# Patient Record
Sex: FEMALE | Race: WHITE
[De-identification: names, ages, dates, MRNs, and addresses within clinical notes are randomized per-mention and may not be internally consistent; named-entity substitution may affect disease eponyms.]

---

## 2019-07-23 ENCOUNTER — HOSPITAL ENCOUNTER (OUTPATIENT)
Dept: HOSPITAL 74 - JASU-SURG | Age: 64
Discharge: HOME | End: 2019-07-23
Attending: OBSTETRICS & GYNECOLOGY
Payer: COMMERCIAL

## 2019-07-23 VITALS — TEMPERATURE: 98 F | HEART RATE: 66 BPM | SYSTOLIC BLOOD PRESSURE: 131 MMHG | DIASTOLIC BLOOD PRESSURE: 71 MMHG

## 2019-07-23 VITALS — BODY MASS INDEX: 35.3 KG/M2

## 2019-07-23 DIAGNOSIS — N84.0: ICD-10-CM

## 2019-07-23 DIAGNOSIS — D25.9: Primary | ICD-10-CM

## 2019-07-23 PROCEDURE — 0UDB7ZX EXTRACTION OF ENDOMETRIUM, VIA NATURAL OR ARTIFICIAL OPENING, DIAGNOSTIC: ICD-10-PCS | Performed by: OBSTETRICS & GYNECOLOGY

## 2019-07-23 PROCEDURE — 0UB98ZZ EXCISION OF UTERUS, VIA NATURAL OR ARTIFICIAL OPENING ENDOSCOPIC: ICD-10-PCS | Performed by: OBSTETRICS & GYNECOLOGY

## 2019-07-23 PROCEDURE — 0UB97ZX EXCISION OF UTERUS, VIA NATURAL OR ARTIFICIAL OPENING, DIAGNOSTIC: ICD-10-PCS | Performed by: OBSTETRICS & GYNECOLOGY

## 2019-07-23 PROCEDURE — 0UJD8ZZ INSPECTION OF UTERUS AND CERVIX, VIA NATURAL OR ARTIFICIAL OPENING ENDOSCOPIC: ICD-10-PCS | Performed by: OBSTETRICS & GYNECOLOGY

## 2019-07-23 NOTE — HP
History & Physical Update





- History


History: No Change (Consent signed and witnessed All questions answered)





- Physical


Physical: No Change





- Assessment


Assessment: No Change





- Plan


Plan: No Change (Procedure explained to the patient Consent signed and witnessed

)

## 2019-07-23 NOTE — OP
DATE OF OPERATION:  07/23/2019

 

PREOPERATIVE DIAGNOSIS:  A 63-year-old with thick endometrium.

 

OPERATION:  Hysteroscopy, myomectomy, polypectomy, dilation and curettage.

 

FINDINGS:  Anterior fundal 1-cm fibroid, removed and sent to Pathology; and lower

uterine segment polyp removed and sent to Pathology.

 

POSTOPERATIVE DIAGNOSIS:  A 63-year-old with thick endometrium.

 

SURGEON:  Holly Calderon MD

 

ANESTHESIOLOGIST:  Milly Shafer MD

 

ANESTHESIA:  General.

 

DESCRIPTION OF THE OPERATIVE PROCEDURE:  After assuring informed consent, patient was

brought to the operating room, where she was placed in dorsal lithotomy position. 

Perineum and vagina were prepped and draped in sterile fashion.  Hendrickson retractors were

placed into the vagina and cervix was articulated with single-tooth tenaculum. 

Cervix was dilated without any difficulty and a Symphion hysteroscope was introduced

into the cervix and the uterus.  Above findings were noted, and the hand-held

resecting device was introduced through the resecting port.  A 9-Belgian resecting

device was used to remove the fibroid and the polyp.  Excellent hemostasis was noted.

 All instruments were removed from the uterus, cervix, and vagina.  Estimated blood

loss 5 mL.  Urine output 100 mL.  The patient received 600 mL of IV fluid.  Patient

tolerated the procedure well.  Instrument and sponge count was correct x2.  Patient

was brought to the recovery room in stable condition.

 

 

 

RON [id number]

DD: 07/23/2019 14:56

DT: 07/23/2019 21:55

Job #:  [id number]

## 2019-07-23 NOTE — OP
Operative Note





- Note:


Operative Date: 07/23/19


Pre-Operative Diagnosis: 64yo with thick Endometrium


Operation: Hysteroscopy Myomectomy, polypectomy, D&C


Findings: 





1. Anterior/fundal 1cm fibroid - removed sent to pathology


2. Low uterine segment polyp - removed sent to pathology


Post-Operative Diagnosis: Same as Pre-op


Surgeon: Holly Calderon


Anesthesiologist/CRNA: Milly Shafer MD


Anesthesia: General


Estimated Blood Loss (mls): 5


Drains, Volume Out (mls): 100


Fluid Volume Replaced (mls): 600


Operative Report Dictated: Yes

## 2019-07-24 NOTE — PATH
Surgical Pathology Report



Patient Name:  IVORY PENA

Accession #:  [id number]

Med. Rec. #:  [id number]                                                        

   /Age/Gender:  1955 (Age: 63) / F

Account:  [id number]                                                          

             Location: [facility] SUR[facility]

Taken:  2019

Received:  2019

Reported:  2019

Physicians:  Holly Caldeorn M.D.

  



Specimen(s) Received

A: ENDOMETRIAL CURETTINGS 

B: FIBROID 





Clinical History

Endometrial polyp







Final Diagnosis

A. ENDOMETRIAL CURETTINGS, DILATION AND CURETTAGE:

FRAGMENTS OF FIBROMUSCULAR TISSUE CONSISTENT WITH SUBMUCOSAL LEIOMYOMA, STRIPS

OF ENDOMETRIUM CONSISTENT WITH ATROPHIC ENDOMETRIUM, AND BENIGN CERVICAL TISSUE

IN A BACKGROUND OF ABUNDANT MUCUS.



B. FIBROID, MYOMECTOMY:

RARE FRAGMENTS OF FIBROMUSCULAR TISSUE CONSISTENT WITH SUBMUCOSAL LEIOMYOMA.

SCANT BENIGN CERVICAL TISSUE.





***Electronically Signed***

Viola Yepez M.D.





Gross Description

A. Received in formalin labeled "endometrial curettings," is a 1.5 x 1.0 x 0.3

cm aggregate of tan-brown soft tissue fragments admixed with mucus. The formalin

is filtered and the specimen is entirely submitted in one cassette.



B. Received in formalin labeled "fibroid," is a less than 1 g, 0.2 x 0.1 x 0.1

cm aggregate of tan soft tissue fragments. The formalin is filtered and the

specimen is entirely submitted in one cassette.

/2019



saudi/[email or web address]